# Patient Record
Sex: MALE | Race: WHITE | NOT HISPANIC OR LATINO | URBAN - NONMETROPOLITAN AREA
[De-identification: names, ages, dates, MRNs, and addresses within clinical notes are randomized per-mention and may not be internally consistent; named-entity substitution may affect disease eponyms.]

---

## 2019-06-21 ENCOUNTER — IMPORTED ENCOUNTER (OUTPATIENT)
Dept: URBAN - NONMETROPOLITAN AREA CLINIC 1 | Facility: CLINIC | Age: 69
End: 2019-06-21

## 2019-06-21 PROBLEM — H52.03: Noted: 2019-06-21

## 2019-06-21 PROBLEM — H25.813: Noted: 2019-06-21

## 2019-06-21 PROBLEM — H52.223: Noted: 2019-06-21

## 2019-06-21 PROBLEM — E11.9: Noted: 2019-06-21

## 2019-06-21 PROBLEM — H52.4: Noted: 2019-06-21

## 2019-06-21 PROCEDURE — 92015 DETERMINE REFRACTIVE STATE: CPT

## 2019-06-21 PROCEDURE — 99203 OFFICE O/P NEW LOW 30 MIN: CPT

## 2019-06-21 NOTE — PATIENT DISCUSSION
Gonio narrow open inf w/moderate pigment OD little deeper open w/moderate pigment no guttata no TI defectsCataract OU-Not yet surgical. -Reviewed symptoms of advancing cataract growth such as glare and halos and decreased vision.-Continue to monitor for now. Pt will notify us if any new symptoms develop. DM s DR-A1C- last checked in [facility] number unknown-does not check BS at [facility] the importance of keeping blood sugars under control blood pressure under control and weight normalization and regular visits with PCP. -Explained the possible effects of poorly controlled diabetes and the damage that diabetes can cause to ocular health. -Patient to check HgbA1C.-Pt instructed to contact our office with any vision changes. Hyperopia-Discussed diagnosis with patient. Astigmatism-Discussed diagnosis with patient. Presbyopia-Discussed diagnosis with patient. Updated spec Rx given. Recommend lens that will provide comfort as well as protect safety and health of eyes. RTC 1 YR CEE letter to [employer]

## 2022-04-09 ASSESSMENT — VISUAL ACUITY
OD_GLARE: 20/40
OD_SC: 20/30+2
OU_CC: J1+
OS_GLARE: 20/40
OS_SC: 20/30-1

## 2022-04-09 ASSESSMENT — TONOMETRY
OS_IOP_MMHG: 16
OD_IOP_MMHG: 16